# Patient Record
Sex: MALE
[De-identification: names, ages, dates, MRNs, and addresses within clinical notes are randomized per-mention and may not be internally consistent; named-entity substitution may affect disease eponyms.]

---
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
## 2019-08-14 ENCOUNTER — EMERGENCY (EMERGENCY)
Facility: HOSPITAL | Age: 19
LOS: 0 days | Discharge: HOME | End: 2019-08-14
Attending: STUDENT IN AN ORGANIZED HEALTH CARE EDUCATION/TRAINING PROGRAM | Admitting: STUDENT IN AN ORGANIZED HEALTH CARE EDUCATION/TRAINING PROGRAM
Payer: MEDICAID

## 2019-08-14 VITALS
RESPIRATION RATE: 18 BRPM | DIASTOLIC BLOOD PRESSURE: 65 MMHG | TEMPERATURE: 98 F | HEART RATE: 73 BPM | SYSTOLIC BLOOD PRESSURE: 133 MMHG | OXYGEN SATURATION: 100 %
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
## 2019-08-14 DIAGNOSIS — S60.221A CONTUSION OF RIGHT HAND, INITIAL ENCOUNTER: ICD-10-CM

## 2019-08-14 DIAGNOSIS — M79.641 PAIN IN RIGHT HAND: ICD-10-CM
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
## 2019-08-14 PROCEDURE — 99283 EMERGENCY DEPT VISIT LOW MDM: CPT | Mod: 25

## 2019-08-14 PROCEDURE — 29125 APPL SHORT ARM SPLINT STATIC: CPT | Mod: RT

## 2019-08-14 PROCEDURE — 73130 X-RAY EXAM OF HAND: CPT | Mod: 26,RT

## 2019-08-14 NOTE — ED PROVIDER NOTE - PHYSICAL EXAMINATION
PHYSICAL EXAM:    General: Well developed, in no acute distress    Eyes: PERRL (A)  Respiratory: No chest wall deformity, normal respiratory pattern, clear to auscultation bilaterally  Cardiovascular: Regular rate and rhythm. S1 and S2 Normal; No murmurs, gallops or rubs  Abdominal: Soft non-tender non-distended  Extremities: Full range of motion. Able to open and close wrist with right hand, no difficulties. Mild tenderness to palpation of 5th digit. No tenderness/difficulty with wrist extension.   Vascular: Upper peripheral pulses palpable 2+ bilaterally  Neurological: Alert  Skin: Warm and dry. 3cm bruising to dorsal ulnar side right hand.

## 2019-08-14 NOTE — ED PROVIDER NOTE - NSFOLLOWUPINSTRUCTIONS_ED_ALL_ED_FT
Wrist Splint, Adult    A wrist splint holds your wrist in a position so it does not move. A splint supports your wrist like a cast, but it is not stiff like a cast (is flexible). You can take it off or make it looser. You may need a wrist splint if you hurt your wrist or have swelling in your wrist. A splint can:  Support your wrist.  Protect your wrist when it is hurt (injured).  Help you not hurt your wrist again.  Make your wrist stay still and not move.  Lessen pain.  Help your wrist heal.  It is important to wear your splint as told by your doctor. This helps you make sure that your wrist heals the right way.    What are the risks?  If you wear your splint too tight or you have a lot of swelling, blood may not be able to go to your wrist or hand. If this happens, you can get a condition called compartment syndrome. It can be dangerous and cause damage that lasts. Symptoms are:  Pain in your wrist that gets worse.  Tingling.  Having no feeling in your wrist or hand. This is called numbness.  Changes in skin color. The skin may look very light (pale) or kind of blue.  Cold fingers.  Other risks of wearing a splint may be:  A stiff wrist.  A weak wrist.  Skin irritation that can cause:  Itching.  Rash.  Sores.  Infection.  How to use your wrist splint  Your wrist splint should be tight enough to support your wrist. It should not block blood from going to your hand or wrist. Your doctor will tell you how to wear your wrist splint and how long to wear it.    Splint wear     Wear the splint as told by your doctor. Only take it off as told by your doctor.  Loosen the splint if your fingers tingle, get numb, or turn cold and blue.  Keep the splint clean.  If the splint is not waterproof:  Do not let it get wet.  Cover it with a watertight covering when you take a bath or a shower  Do not stick anything inside the splint to scratch your skin. Doing that increases your risk of infection.  Check the skin under the splint every time you take it off. Check for any redness or blisters. Tell your doctor about any skin problems.  Managing pain, stiffness, and swelling     If directed, put ice on the injured area.  If you a have a splint that can be taken off, take it off as told by your doctor.  Put ice in a plastic bag.  Place a towel between your skin and the bag.  Leave the ice on for 20 minutes, 2–3 times a day.  Move your fingers often to avoid stiffness and to lessen swelling.  Raise (elevate) the injured area above the level of your heart while you are sitting or lying down.  Activity     Return to your normal activities as told by your doctor. Ask your doctor what activities are safe for you.  Do exercises as told by your doctor.  Ask your doctor when it is safe to drive with a splint on your wrist.  General instructions     Do not use the injured limb to support (bear) your body weight until your doctor says that you can.  Do not put pressure on any part of the splint until it is fully hardened. This may take many hours.  Do not use any products that have nicotine or tobacco in them, such as cigarettes and e-cigarettes. If you need help quitting, ask your doctor.  Take over-the-counter and prescription medicines only as told by your doctor.  Keep all follow-up visits as told by your doctor. This is important.  Get help if:  You have wrist pain or swelling that does not go away.  The skin around or under your splint gets red, itchy, or moist.  You have chills or fever.  Your splint feels too tight or too loose.  Your splint breaks.  Get help right away if:  You have pain that gets worse.  You have tingling and numbness.  You have changes in skin color, including paleness or a bluish color.  Your fingers are cold.  Summary  A wrist splint is a flexible device that supports your wrist and keeps your wrist from moving.  It is important to wear your splint as told by your doctor. This helps to make sure that your wrist heals correctly.  Icing, moving your fingers, and raising your wrist above the level of your heart will help you manage pain, stiffness, and swelling.  Your wrist splint should be tight enough to support your wrist. It should not block your blood supply.  Get help right away if your fingers tingle, get numb, or turn cold and blue. Loosen the splint right away.  This information is not intended to replace advice given to you by your health care provider. Make sure you discuss any questions you have with your health care provider.

## 2019-08-14 NOTE — ED PROVIDER NOTE - CARE PROVIDER_API CALL
Mason Yanez (MD)  Orthopaedic Surgery  [address] [place] [address]  Phone: [phone]  Fax: [phone]  Follow Up Time: 1-3 Days
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
## 2019-08-14 NOTE — ED PROVIDER NOTE - CARE PLAN
Principal Discharge DX:	Injury of right hand, initial encounter  Assessment and plan of treatment:	Splinted, ortho f/u

## 2019-08-14 NOTE — ED PROVIDER NOTE - NS ED ROS FT
Constitutional:  (-) fever,  Neuro:  (-) numbness (-) tingling (-) focal weakness  Skin:  (-) abrasions   Except as documented in the HPI,  all other systems are negative

## 2019-08-14 NOTE — ED PROVIDER NOTE - PROGRESS NOTE DETAILS
Patient refused pain medication at this time. Ulnar gutter splint applied. Will f/u with Ortho Dr. Yanez.

## 2020-02-16 ENCOUNTER — EMERGENCY (EMERGENCY)
Facility: HOSPITAL | Age: 20
LOS: 0 days | Discharge: HOME | End: 2020-02-16
Attending: EMERGENCY MEDICINE | Admitting: EMERGENCY MEDICINE
Payer: MEDICAID

## 2020-02-16 VITALS
RESPIRATION RATE: 20 BRPM | TEMPERATURE: 97 F | SYSTOLIC BLOOD PRESSURE: 122 MMHG | HEART RATE: 95 BPM | OXYGEN SATURATION: 100 % | DIASTOLIC BLOOD PRESSURE: 70 MMHG

## 2020-02-16 DIAGNOSIS — S69.90XA UNSPECIFIED INJURY OF UNSPECIFIED WRIST, HAND AND FINGER(S), INITIAL ENCOUNTER: ICD-10-CM
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
## 2020-02-16 DIAGNOSIS — S60.511A ABRASION OF RIGHT HAND, INITIAL ENCOUNTER: ICD-10-CM
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
## 2020-02-16 DIAGNOSIS — F17.200 NICOTINE DEPENDENCE, UNSPECIFIED, UNCOMPLICATED: ICD-10-CM
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
## 2020-02-16 DIAGNOSIS — Z23 ENCOUNTER FOR IMMUNIZATION: ICD-10-CM

## 2020-02-16 PROBLEM — Z78.9 OTHER SPECIFIED HEALTH STATUS: Chronic | Status: ACTIVE | Noted: 2019-08-14

## 2020-02-16 PROCEDURE — 99283 EMERGENCY DEPT VISIT LOW MDM: CPT

## 2020-02-16 PROCEDURE — 73130 X-RAY EXAM OF HAND: CPT | Mod: 26,RT

## 2020-02-16 RX ORDER — TETANUS TOXOID, REDUCED DIPHTHERIA TOXOID AND ACELLULAR PERTUSSIS VACCINE, ADSORBED 5; 2.5; 8; 8; 2.5 [IU]/.5ML; [IU]/.5ML; UG/.5ML; UG/.5ML; UG/.5ML
0.5 SUSPENSION INTRAMUSCULAR ONCE
Refills: 0 | Status: COMPLETED | OUTPATIENT
Start: 2020-02-16 | End: 2020-02-16

## 2020-02-16 RX ADMIN — TETANUS TOXOID, REDUCED DIPHTHERIA TOXOID AND ACELLULAR PERTUSSIS VACCINE, ADSORBED 0.5 MILLILITER(S): 5; 2.5; 8; 8; 2.5 SUSPENSION INTRAMUSCULAR at 01:36

## 2020-02-16 NOTE — ED PROVIDER NOTE - OBJECTIVE STATEMENT
20 yo M with no PMHx who presents with foreign body sensation in R hand x 2 hrs. Pt was picking up a broken glass frame he felt a piece go into his skin and "move" to the palm of 18 yo M with no PMHx who presents with foreign body sensation in R hand x 2 hrs. Pt was picking up a broken glass frame he felt a piece go into his skin and "move" to the palm of his hand. No numbness, swelling, weakness. Last tdap unknown. R hand dominant. 20 yo M with no PMHx who presents with foreign body sensation in R hand x 2 hrs. Pt was picking up a broken glass frame when he felt a piece go into his skin and "move" to the palm of his hand. No numbness, swelling, weakness. Last tdap unknown. R hand dominant.

## 2020-02-16 NOTE — ED PROVIDER NOTE - NS ED ROS FT
GEN:  no fever, no chills  NEURO:  no headache, no dizziness  ENT: no sore throat, no runny nose  CV:  no chest pain, no palpitations  RESP:  no sob, no cough  GI:  no nausea, no vomiting, no abdominal pain  :  no dysuria  MSK:  + hand pain, no edema  SKIN:  no rash, no bruising  HEME: no bleeding

## 2020-02-16 NOTE — ED PEDIATRIC NURSE NOTE - NSIMPLEMENTINTERV_GEN_ALL_ED
Implemented All Universal Safety Interventions:  [place] to call system. Call bell, personal items and telephone within reach. Instruct patient to call for assistance. Room bathroom lighting operational. Non-slip footwear when patient is off stretcher. Physically safe environment: no spills, clutter or unnecessary equipment. Stretcher in lowest position, wheels locked, appropriate side rails in place.

## 2020-02-16 NOTE — ED PROVIDER NOTE - PROGRESS NOTE DETAILS
TC TC: Previously healthy 20 yo M who presents with glass foreign body sensation in R hand. Neurovascularly intact. Full ROM. No obvious foreign body visualized. Ordered xray. Updated tdap. Will reassess. TC: No obvious foreign body seen on xray. Instructed on supportive care and discussed possibility for retained occult foreign body. Pt to f/u with PMD. Strict ED return precautions given. Pt verbalized understanding and was agreeable with plan.

## 2020-02-16 NOTE — ED PEDIATRIC NURSE NOTE - OBJECTIVE STATEMENT
pt presents to ed with c/o foreign body in hand. pt states he was picking up a broken glass frame when he felt a piece go into his skin and "move" to the palm of his hand. No numbness, swelling, weakness.

## 2020-02-16 NOTE — ED PROVIDER NOTE - CLINICAL SUMMARY MEDICAL DECISION MAKING FREE TEXT BOX
19yr has an abrasion to the right hand no glass  follow up pmd  ED evaluation and management discussed with the  patient in detail.  Close PMD follow up encouraged.  Strict ED return instructions discussed in detail and patient was given the opportunity to ask any questions about their discharge diagnosis and instructions. Patient  verbalized understanding.

## 2020-02-16 NOTE — ED PROVIDER NOTE - ATTENDING CONTRIBUTION TO CARE
19yr male picked up a glass frame and thinks he got glass in his palm.   VS reviewed, stable.  Gen: interactive, well appearing, no acute distress  HEENT: NC/AT,  right TM  non bulging  left tm,  no evidence of mastoiditis,  moist mucus membranes, pupils equal, responsive, reactive to light and accomodation, no conjunctivitis or scleral icterus; no nasal discharge .   OP no exudates no erythema  Neck: FROM, supple, no cervical LAD  Chest: CTA b/l, no crackles/wheezes, good air entry, no tachypnea or retractions  CV: regular rate and rhythm, no murmurs   Abd: soft, nontender, nondistended, no HSM appreciated, +BS  pt with a right hand hogue abrasion no evidence of laceration  plan will obtain xray

## 2020-02-16 NOTE — ED PROVIDER NOTE - PHYSICAL EXAMINATION
CONSTITUTIONAL: well developed, nontoxic appearing, in no acute distress, speaking in full sentences  SKIN: warm, dry, no rash, cap refill < 2 seconds  HEENT: normocephalic, atraumatic, no conjunctival erythema, moist mucous membranes, patent airway  NECK: supple, no masses  CV:  regular rate, regular rhythm, 2+ radial pulses bilaterally  RESP: normal work of breathing  ABD: soft, nontender, nondistended  MSK: 0.5 cm abrasion to R hand along thenar eminence, 2+ radial pulses, normal OK sign, normal finger adduction/abduction, sensation intact to touch, 5/5  strength bilaterally  NEURO: alert, oriented, grossly unremarkable  PSYCH: cooperative, appropriate

## 2020-02-16 NOTE — ED PROVIDER NOTE - NSFOLLOWUPINSTRUCTIONS_ED_ALL_ED_FT
Abrasion     An abrasion is a cut or a scrape on the outer surface of the skin. An abrasion does not go through all the layers of the skin. It is important to care for your abrasion properly to prevent infection.        What are the causes?    This condition is caused by falling on or gliding across the ground or another surface. When your skin rubs on something, the outer and inner layers of skin may rub off.        What are the signs or symptoms?    The main symptom of this condition is a cut or a scrape. The scrape may be bleeding, or it may appear red or pink. If the abrasion was caused by a fall, there may be a bruise under the cut or scrape.        How is this diagnosed?    An abrasion is diagnosed with a physical exam.        How is this treated?    Treatment for this condition depends on how large and deep the abrasion is. In most cases:    Your abrasion will be cleaned with water and mild soap. This is done to remove any dirt or debris (such as particles of glass or rock) that may be stuck in the wound.    An antibiotic ointment may be applied to the abrasion to help prevent infection.    A bandage (dressing) may be placed on the abrasion to keep it clean.    You may also need a tetanus shot.        Follow these instructions at home:    Medicines     Take or apply over-the-counter and prescription medicines only as told by your health care provider.    If you were prescribed an antibiotic medicine, apply it as told by your health care provider.    Wound care     Clean the wound 2–3 times a day, or as directed by your health care provider. To do this, wash the wound with mild soap and water, rinse off the soap, and pat the wound dry with a clean towel. Do not rub the wound.    Keep the dressing clean and dry as told by your health care provider.        There are many different ways to close and cover a wound. Follow instructions from your health care provider about:    Caring for your wound.    Changing and removing your dressing. You may have to change your dressing one or more times a day, or as directed by your health care provider.    Check your wound every day for signs of infection. Check for:    - Redness, particularly a red streak that spreads out from the wound.  - Swelling or increased pain.  - Warmth.  - Fluid, pus, or a bad smell.    If directed, put ice on the injured area to reduce pain and swelling:    Put ice in a plastic bag. Place a towel between your skin and the bag. Leave the ice on for 20 minutes, 2–3 times a day.        General instructions     Do not take baths, swim, or use a hot tub until your health care provider says it is okay to do so.    If possible, raise (elevate) the injured area above the level of your heart while you are sitting or lying down. This will reduce pain and swelling.    Keep all follow-up visits as directed by your health care provider. This is important.        Contact a health care provider if:    - You received a tetanus shot, and you have swelling, severe pain, redness, or bleeding at the injection site.  - Your pain is not controlled with medicine.  - You have redness, swelling, or more pain at the site of your wound.        Get help right away if:    - You have a red streak spreading away from your wound.  - You have a fever.  - You have fluid, blood, or pus coming from your wound.  - You notice a bad smell coming from your wound or your dressing.        Summary    An abrasion is a cut or a scrape on the outer surface of the skin. An abrasion does not go through all the layers of the skin.    Care for your abrasion properly to prevent infection.    Clean the wound with mild soap and water 2–3 times a day. Follow instructions from your health care provider about taking medicines and changing your bandage (dressing).    Contact your health care provider if you have redness, swelling or more pain in the wound area.    Get help right away if you have a fever or if you have fluid, blood, pus, a bad smell, or a red streak coming from the wound.    This information is not intended to replace advice given to you by your health care provider. Make sure you discuss any questions you have with your health care provider.

## 2020-02-16 NOTE — ED PROVIDER NOTE - PATIENT PORTAL LINK FT
You can access the FollowMyHealth Patient Portal offered by [facility] by registering at the following website: [email or web address]. By joining [facility] [employer]’s FollowMyHealth portal, you will also be able to view your health information using other applications (apps) compatible with our system.

## 2020-04-01 ENCOUNTER — OUTPATIENT (OUTPATIENT)
Dept: OUTPATIENT SERVICES | Facility: HOSPITAL | Age: 20
LOS: 1 days | End: 2020-04-01
Payer: MEDICAID

## 2020-04-01 PROCEDURE — G9001: CPT

## 2020-04-22 DIAGNOSIS — Z71.89 OTHER SPECIFIED COUNSELING: ICD-10-CM

## 2023-08-15 NOTE — ED PROVIDER NOTE - PRINCIPAL DIAGNOSIS
Abrasion of hand Olanzapine Counseling- I discussed with the patient the common side effects of olanzapine including but are not limited to: lack of energy, dry mouth, increased appetite, sleepiness, tremor, constipation, dizziness, changes in behavior, or restlessness.  Explained that teenagers are more likely to experience headaches, abdominal pain, pain in the arms or legs, tiredness, and sleepiness.  Serious side effects include but are not limited: increased risk of death in elderly patients who are confused, have memory loss, or dementia-related psychosis; hyperglycemia; increased cholesterol and triglycerides; and weight gain.